# Patient Record
Sex: MALE | Race: ASIAN | NOT HISPANIC OR LATINO
[De-identification: names, ages, dates, MRNs, and addresses within clinical notes are randomized per-mention and may not be internally consistent; named-entity substitution may affect disease eponyms.]

---

## 2017-01-16 ENCOUNTER — APPOINTMENT (OUTPATIENT)
Dept: FAMILY MEDICINE | Facility: CLINIC | Age: 22
End: 2017-01-16

## 2017-01-16 VITALS — TEMPERATURE: 97.3 F | SYSTOLIC BLOOD PRESSURE: 116 MMHG | DIASTOLIC BLOOD PRESSURE: 72 MMHG

## 2017-01-16 DIAGNOSIS — Z87.09 PERSONAL HISTORY OF OTHER DISEASES OF THE RESPIRATORY SYSTEM: ICD-10-CM

## 2018-01-05 ENCOUNTER — APPOINTMENT (OUTPATIENT)
Dept: CARDIOLOGY | Facility: CLINIC | Age: 23
End: 2018-01-05
Payer: COMMERCIAL

## 2018-01-05 ENCOUNTER — NON-APPOINTMENT (OUTPATIENT)
Age: 23
End: 2018-01-05

## 2018-01-05 VITALS
TEMPERATURE: 98.5 F | OXYGEN SATURATION: 96 % | BODY MASS INDEX: 26.96 KG/M2 | WEIGHT: 182 LBS | DIASTOLIC BLOOD PRESSURE: 76 MMHG | HEIGHT: 69 IN | HEART RATE: 100 BPM | SYSTOLIC BLOOD PRESSURE: 125 MMHG

## 2018-01-05 VITALS — SYSTOLIC BLOOD PRESSURE: 108 MMHG | HEART RATE: 76 BPM | DIASTOLIC BLOOD PRESSURE: 70 MMHG

## 2018-01-05 DIAGNOSIS — Z79.899 OTHER LONG TERM (CURRENT) DRUG THERAPY: ICD-10-CM

## 2018-01-05 PROCEDURE — 93000 ELECTROCARDIOGRAM COMPLETE: CPT

## 2018-01-05 PROCEDURE — 99203 OFFICE O/P NEW LOW 30 MIN: CPT

## 2018-01-05 RX ORDER — AMOXICILLIN AND CLAVULANATE POTASSIUM 875; 125 MG/1; MG/1
875-125 TABLET, COATED ORAL TWICE DAILY
Qty: 14 | Refills: 0 | Status: DISCONTINUED | COMMUNITY
Start: 2017-01-16 | End: 2018-01-05

## 2018-07-24 ENCOUNTER — APPOINTMENT (OUTPATIENT)
Dept: FAMILY MEDICINE | Facility: CLINIC | Age: 23
End: 2018-07-24
Payer: COMMERCIAL

## 2018-07-24 VITALS
HEART RATE: 70 BPM | WEIGHT: 171 LBS | BODY MASS INDEX: 25.33 KG/M2 | HEIGHT: 69 IN | RESPIRATION RATE: 14 BRPM | SYSTOLIC BLOOD PRESSURE: 118 MMHG | TEMPERATURE: 98.4 F | DIASTOLIC BLOOD PRESSURE: 70 MMHG | OXYGEN SATURATION: 98 %

## 2018-07-24 DIAGNOSIS — Z83.3 FAMILY HISTORY OF DIABETES MELLITUS: ICD-10-CM

## 2018-07-24 DIAGNOSIS — Z83.49 FAMILY HISTORY OF OTHER ENDOCRINE, NUTRITIONAL AND METABOLIC DISEASES: ICD-10-CM

## 2018-07-24 DIAGNOSIS — Z23 ENCOUNTER FOR IMMUNIZATION: ICD-10-CM

## 2018-07-24 DIAGNOSIS — Z80.3 FAMILY HISTORY OF MALIGNANT NEOPLASM OF BREAST: ICD-10-CM

## 2018-07-24 DIAGNOSIS — Z00.00 ENCOUNTER FOR GENERAL ADULT MEDICAL EXAMINATION W/OUT ABNORMAL FINDINGS: ICD-10-CM

## 2018-07-24 DIAGNOSIS — E78.5 HYPERLIPIDEMIA, UNSPECIFIED: ICD-10-CM

## 2018-07-24 PROCEDURE — 99395 PREV VISIT EST AGE 18-39: CPT

## 2018-07-24 NOTE — PLAN
[FreeTextEntry1] : 24 yo healthy male here for CPE.   School form for MMR immunization record  completed and provided along with copy of pediatric immunization record.  \par -Pt will RTO for Bexsero vaccine series.  \par -Order for CPE labs provided to pt.

## 2018-07-24 NOTE — REVIEW OF SYSTEMS
[Postnasal Drip] : postnasal drip [Nausea] : nausea [Dizziness] : dizziness [Depression] : depression [Negative] : Heme/Lymph [Abdominal Pain] : no abdominal pain [Constipation] : no constipation [Diarrhea] : no diarrhea [Vomiting] : no vomiting [Heartburn] : no heartburn [Melena] : no melena [Headache] : no headache [Fainting] : no fainting [Confusion] : no confusion [Unsteady Walk] : no ataxia [Memory Loss] : no memory loss [Suicidal] : not suicidal [Insomnia] : no insomnia [Anxiety] : no anxiety [FreeTextEntry7] : nausea with reintroducing carbs post diet [de-identified] : Dizziness with fatigue
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
## 2018-07-24 NOTE — HEALTH RISK ASSESSMENT
[0] : 1) Little interest or pleasure doing things: Not at all (0) [1] : 2) Feeling down, depressed, or hopeless for several days (1) [HIV Test offered] : HIV Test offered [With Family] : lives with family [Student] : student [College] : College [Single] : single [Sexually Active] : sexually active [Feels Safe at Home] : Feels safe at home [] : No [de-identified] : psychiatry, cardiology [de-identified] : Light social [[id number]] : 1 [Reports changes in hearing] : Reports no changes in hearing [Reports changes in vision] : Reports no changes in vision [Reports changes in dental health] : Reports no changes in dental health [FreeTextEntry2] : student

## 2018-08-07 ENCOUNTER — TRANSCRIPTION ENCOUNTER (OUTPATIENT)
Age: 23
End: 2018-08-07

## 2018-08-09 ENCOUNTER — RESULT REVIEW (OUTPATIENT)
Age: 23
End: 2018-08-09

## 2019-04-15 ENCOUNTER — APPOINTMENT (OUTPATIENT)
Dept: FAMILY MEDICINE | Facility: CLINIC | Age: 24
End: 2019-04-15
Payer: COMMERCIAL

## 2019-04-15 VITALS
WEIGHT: 166.6 LBS | BODY MASS INDEX: 24.68 KG/M2 | SYSTOLIC BLOOD PRESSURE: 110 MMHG | DIASTOLIC BLOOD PRESSURE: 68 MMHG | HEIGHT: 69 IN

## 2019-04-15 DIAGNOSIS — F31.9 BIPOLAR DISORDER, UNSPECIFIED: ICD-10-CM

## 2019-04-15 PROCEDURE — 99214 OFFICE O/P EST MOD 30 MIN: CPT

## 2019-04-15 NOTE — PLAN
[FreeTextEntry1] : Bipolar\par - Continue current regiment with medications and therapy.\par - Create crisis plan while abroad as treatment may be difficult to find\par \par Foreign Travel\par - Recommended Travel and Immunization center for location specific immunizations

## 2019-04-15 NOTE — HISTORY OF PRESENT ILLNESS
[de-identified] : 25y/o M w/ PMHx of Bipolar disorder presents to the office to have a medical clearance for for travel.  Planning to study abroad to [place] for 9wks. Offers no complaints at this time.  Bipolar is well managed on medications.  Has not had a Bipolar crisis since 2016.  Sees a therapist for maintenance.  Reports good support system with family and friends. No new medications. Offers no new complaints at this time. NKDA  Psych: Dr. Sudhir Crowe ([facility])

## 2019-04-15 NOTE — ADDENDUM
[FreeTextEntry1] : I, Woodrow Dennis, acted solely as a scribe for Dr. Rivera on this date 04/15/2019.\par \par All medical record entries made by the Scribe were at my, Dr. Rivera, direction and personally dictated by me on 04/15/2019. I have reviewed the chart and agree that the record accurately reflects my personal performance of the history, physical exam, assessment and plan.  I have also personally directed, reviewed and agreed with the chart.\par

## 2019-04-15 NOTE — PHYSICAL EXAM
[Normal] : normal gait, coordination grossly intact, no focal deficits [de-identified] : Normal mood and affect.

## 2020-12-15 PROBLEM — Z87.09 HISTORY OF ACUTE SINUSITIS: Status: RESOLVED | Noted: 2017-01-16 | Resolved: 2020-12-15

## 2021-08-29 ENCOUNTER — TRANSCRIPTION ENCOUNTER (OUTPATIENT)
Age: 26
End: 2021-08-29

## 2025-05-05 ENCOUNTER — NON-APPOINTMENT (OUTPATIENT)
Age: 30
End: 2025-05-05